# Patient Record
Sex: FEMALE | Race: WHITE
[De-identification: names, ages, dates, MRNs, and addresses within clinical notes are randomized per-mention and may not be internally consistent; named-entity substitution may affect disease eponyms.]

---

## 2020-06-10 ENCOUNTER — HOSPITAL ENCOUNTER (EMERGENCY)
Dept: HOSPITAL 88 - ER | Age: 65
Discharge: HOME | End: 2020-06-10
Payer: SELF-PAY

## 2020-06-10 VITALS — SYSTOLIC BLOOD PRESSURE: 148 MMHG | DIASTOLIC BLOOD PRESSURE: 79 MMHG

## 2020-06-10 VITALS — WEIGHT: 151 LBS | BODY MASS INDEX: 26.75 KG/M2 | HEIGHT: 63 IN

## 2020-06-10 DIAGNOSIS — Z86.73: ICD-10-CM

## 2020-06-10 DIAGNOSIS — S00.83XA: Primary | ICD-10-CM

## 2020-06-10 DIAGNOSIS — F41.9: ICD-10-CM
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
## 2020-06-10 DIAGNOSIS — E78.5: ICD-10-CM

## 2020-06-10 DIAGNOSIS — I10: ICD-10-CM

## 2020-06-10 DIAGNOSIS — Z86.718: ICD-10-CM
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
## 2020-06-10 DIAGNOSIS — F17.210: ICD-10-CM

## 2020-06-10 DIAGNOSIS — M54.2: ICD-10-CM

## 2020-06-10 PROCEDURE — 99283 EMERGENCY DEPT VISIT LOW MDM: CPT

## 2020-06-10 PROCEDURE — 72125 CT NECK SPINE W/O DYE: CPT

## 2020-06-10 PROCEDURE — 70450 CT HEAD/BRAIN W/O DYE: CPT

## 2020-06-10 NOTE — EMERGENCY DEPARTMENT NOTE
History of Present Illnes


History of Present Illness


Chief Complaint:  Head/Face Trauma


History of Present Illness


This is a 64 year old  female presents to the ED for head injury 3 days

prior after tree impacted his head. (-) LOC. Since incident, patient with nausea

and blurriness of vision.  .


Arrival Mode:  Car


Onset (how long ago):  day(s) (3)


Location:  frontal head


Radiation:  Reports non-radiation


Severity:  moderate


Duration (how long):  day(s)


Timing of current episode:  constant


Progression:  worsening


Chronicity:  new


Context:  Reports trauma/injury


Relieving factors:  rest


Exacerbating factors:  movement


Associated symptoms:  Reports headaches, Reports nausea/vomiting


Treatments prior to arrival:  none





Past Medical/Family History


Physician Review


I have reviewed the patient's past medical and family history.  Any updates have

been documented here.





Past Medical History


Recent Fever:  No


Clinical Suspicion of Infectio:  No


New/Unexplained Change in Ment:  No


Past Medical History:  Hypertension, COPD


Other Medical History:  


Depression/anxiety





Cholesterol





Herniated discs





ARF-no dialysis





DVTs





Endometriosis


Other Surgery:  


Right foot sx





R CEA





Attempted L CEA w/out success





Social History


Smoking Cessation:  Current some day smoker


Counseling Performed:  Yes


Alcohol Use:  None


Any Illegal Drug Use:  No





Other


Last Tetanus:  OOD





Review of Systems


Review of Systems


Constitutional:  Reports no symptoms


EENTM:  Reports blurred vision


Cardiovascular:  Reports no symptoms


Respiratory:  Reports no symptoms


Gastrointestinal:  Reports nausea


Genitourinary:  Reports no symptoms


Musculoskeletal:  Reports no symptoms


Integumentary:  Reports no symptoms


Neurological:  Reports headache


Psychological:  Reports no symptoms


Endocrine:  Reports no symptoms


Hematological/Lymphatic:  Reports no symptoms


Review of other systems


All other systems reviewed and negative.





Physical Exam


Related Data


Allergies:  


Coded Allergies:  


     No Known Allergies (Unverified , 17)


Triage Vital Signs





Vital Signs








  Date Time  Temp Pulse Resp B/P (MAP) Pulse Ox O2 Delivery O2 Flow Rate FiO2


 


6/10/20 19:26 99.7 88 20 119/75 98   








Vital signs reviewed:  Yes





Physical Exam


CONSTITUTIONAL





Constitutional:  Reports well-developed, Reports well-nourished


HENT


HENT:  Reports normocephalic, Reports atraumatic, Reports oropharynx 

clear/moist, Reports nose normal


HENT L/R:  Reports left ext ear normal, Reports right ext ear normal


EYES





Eyes:  Reports PERRL, Reports conjunctivae normal


NECK


Neck:  Reports ROM normal


PULMONARY


Pulmonary:  Reports effort normal, Reports breath sounds normal


CARDIOVASCULAR





Cardiovascular:  Reports regular rhythm, Reports heart sounds normal, Reports 

capillary refill normal, Reports normal rate


GASTROINTESTINAL





Abdominal:  Reports soft, Reports nontender, Reports bowel sounds normal


GENITOURINARY





Genitourinary:  Reports exam deferred


SKIN


Skin:  Reports warm, Reports dry


MUSCULOSKELETAL





Musculoskeletal:  Reports ROM normal


NEUROLOGICAL





Neurological:  Reports alert, Reports oriented x 3, Reports no gross motor or 

sensory deficits


PSYCHOLOGICAL


Psychological:  Reports mood/affect normal, Reports judgement normal





Results


Imaging


Imaging results reviewed:  Yes


Impressions


                                        


                        [place] [address]








Patient Name: YOSELIN HSIEH                          MR #: [id number]          


   


: 1955                         Age/Sex: 64/F


Acct #: [id number]                    Req #: [id number]


Adm Physician:                                      


Ordered by: JOSE L ARROYO DO                    Report #: [id number] 


Location: ER                            Room/Bed:           


                                                                                


________________________________________________________________________________


___________________





Procedure: [id number] CT/CT CERVICAL SPINE WO


Exam Date: 06/10/20                            Exam Time: 








                              REPORT STATUS: Signed





Examination: CT CERVICAL SPINE WO CONTRAST





HISTORY:Neck pain and injury after fall.


COMPARISON:None.


TECHNIQUE: Multidetector helical axial images were obtained without contrast


from the foramen magnum to T1.  Coronal and sagittal reformatted images were


done. Bone and soft tissue windows were evaluated. 


Dose modulation, iterative reconstruction, and/or weight based adjustment of


the mA/kV was utilized to reduce the radiation dose to as low as reasonably


achievable. 





FINDINGS:  


Alignment:Normal alignment with straightening of normal lordosis.


Vertebrae:  Normal height and density. No acute fracture, infection or


neoplasm.


Disc space heights: Normal height.


Caliber of spinal canal: Developmentally normal.





Posterior fossa and craniocervical junction: Foramen magnum patent. No Chiari 1


malformation.





Soft tissues: No abnormality.





Degenerative changes:


Bilateral facet arthropathy from C3-C5. 


Grade I anterolisthesis on C4 on C5. 


No disc bulge/ herniation or canal stenosis.





Visualized lung apices:


No abnormalities.





IMPRESSION:


No acute abnormalities.





Bilateral facet arthropathy from C3-C5.


 


Grade I anterolisthesis on C4 on C5. 





Signed by: Dr. Leeanne Blanca M.D. on 6/10/2020 8:51 PM








Dictated By: LEEANNE APARICIO MD


Electronically Signed By: LEEANNE APARICIO MD on 06/10/20 2051


Transcribed By: STEPHANIE on 06/10/20 2051 








COPY TO:   JOSE L ARROYO DO~














                        [place] [address]








Patient Name: YOSELIN HSIEH                          MR #: [id number]          


   


: 1955                         Age/Sex: 64/F


Acct #: [id number]                    Req #: [id number]


Adm Physician:                                      


Ordered by: JOSE L ARROYO DO                    Report #: [id number] 


Location: ER                            Room/Bed:           


                              __________________________________________________


_________________________________________________





Procedure: [id number] CT/CT BRAIN WO


Exam Date: 06/10/20                            Exam Time: 








                              REPORT STATUS: Signed





Examination: CT head without contrast


Clinical Indication: Forehead injury.


Technique: Transaxial noncontrast images from the skull base through the vertex


were obtained. Sagittal and coronal reformatted images were done.


Dose modulation, iterative reconstruction, and/or weight based adjustment of


the mA/kV was utilized to reduce the radiation dose to as low as reasonably


achievable. 


Comparison: None.





Findings:





Scalp: No abnormalities.


Bones: Intact. No fractures.  No blastic or lytic lesions. 





Brain sulci: Appropriate for patient's age.


Ventricles: Normal in size and configuration.   No hydrocephalus.  





Extra-axial space:


No acute abnormalities.


Bifrontal subdural hygromas with maximal thickness of 8mm on each side. 





Parenchyma: 


No abnormal densities.


No masses, hemorrhage, or acute or chronic cortical based vascular insults.





Suprasellar region: No abnormalities.


Craniocervical junction: The foramen magnum is patent.  No Chiari one


malformation.





Impression:





No acute intracranial abnormality.





Signed by: Dr. Leeanne Blanca M.D. on 6/10/2020 8:42 PM








Dictated By: LEEANNE APARICIO MD


Electronically Signed By: LEEANNE APARICIO MD on 06/10/20 2042


Transcribed By: STEPHANIE on 06/10/20 2042 








COPY TO:   JOSE L ARROYO DO~





Assessment & Plan


Medical Decision Making


MDM


64 yof presents with remote h/o of head injury. reports dizziness and nausea 

since incident . CTS ordered and reviewed to r/o brain injury, and skull fx. 

Patient to be discharged to f/u with PCP





Reassessment


Reassessment


Prior to discharge patient requests additional studies to work up her month long

history of LE pain/tingling. Further more had earlier requested to be tested for

antibodies for COVID-19 infection. I explained to the patient that her 

expectations for a full ED evaluation of her chronic issues was not realistic 

and that she would be better suited to f/u with her PCP. Patient refused to 

contact her PCP and patient was instructed that it would be in her best interest

to pursue a relationship with her PCP.





Assessment & Plan


Final Impression:  


(1) Head injury


(2) Anxiety


Last Vital Signs











  Date Time  Temp Pulse Resp B/P (MAP) Pulse Ox O2 Delivery O2 Flow Rate FiO2


 


6/10/20 21:26  84 18  100   


 


6/10/20 21:15 98.5   148/79    








Home Meds


Reported Medications


Aspirin (ASPIRIN) 325 Mg Tablet, 325 MG PO DAILY, TAB


   16


Hydrochlorothiazide (HYDROCHLOROTHIAZIDE) 25 Mg Tablet, 25 MG PO DAILY, #30 TAB


   16


Acetaminophen/Hydrocodone* (NORCO 10MG-325MG*) 1 Ea Tab, 1 TAB PO Q6H PRN for 

PAIN, TAB


   16


Lorazepam (ATIVAN) 2 Mg Tablet, 2 MG PO TID


   16











JOSE L ARROYO DO                Charles 10, 2020 19:26

## 2020-06-10 NOTE — DIAGNOSTIC IMAGING REPORT
Examination: CT head without contrast

Clinical Indication: Forehead injury.

Technique: Transaxial noncontrast images from the skull base through the vertex

were obtained. Sagittal and coronal reformatted images were done.

Dose modulation, iterative reconstruction, and/or weight based adjustment of

the mA/kV was utilized to reduce the radiation dose to as low as reasonably

achievable. 

Comparison: None.



Findings:



Scalp: No abnormalities.

Bones: Intact. No fractures.  No blastic or lytic lesions. 



Brain sulci: Appropriate for patient's age.

Ventricles: Normal in size and configuration.   No hydrocephalus.  



Extra-axial space:

No acute abnormalities.

Bifrontal subdural hygromas with maximal thickness of 8mm on each side. 



Parenchyma: 

No abnormal densities.

No masses, hemorrhage, or acute or chronic cortical based vascular insults.



Suprasellar region: No abnormalities.

Craniocervical junction: The foramen magnum is patent.  No Chiari one

malformation.



Impression:



No acute intracranial abnormality.



Signed by: Dr. Leeanne Blanca M.D. on 6/10/2020 8:42 PM

## 2020-06-10 NOTE — XMS REPORT
Clinical Summary

                             Created on: 06/10/2020



Alycia Zarate

External Reference #: [id number]

: 1955

Sex: Female



Demographics





                          Address                   [address] [place]  [address]

 

                          Home Phone                [phone]

 

                          Preferred Language        English

 

                          Marital Status            

 

                          Catholic Affiliation     [id number]

 

                          Race                      White

 

                          Ethnic Group              Non-





Author





                          Author                    Antwan Quaker

 

                          Organization              [facility]

 

                          Address                   Unknown

 

                          Phone                     Unavailable







Support





                Name            Relationship    Address         Phone

 

                Antonia NAVARRO            Unknown         [phone]







Care Team Providers





                    Care Team Member Name Role                Phone

 

                    Asked, No Pcp       PCP                 Unavailable







Allergies

No Known Allergies



Medications





                          End Date                  Status



              Medication   Sig          Dispensed    Refills      Start  



                                         Date  

 

                                                    Active



                     enalapril (VASOTEC) 20 MG  Take 20 mg by       0   



                           tablet                    mouth daily.     

 

                                                    Active



                     LORAZepam (ATIVAN) 2 MG  Take 2 mg by        0   



                           tablet                    mouth every 8     



                                         (eight) hours     



                                         as needed for     



                                         anxiety.     

 

                                                    Active



                     aspirin 325 MG tablet  Take 325 mg         0   



                                         by mouth     



                                         daily.     

 

                                                    Active



                     PARoxetine (PAXIL) 40 MG  Take 40 mg by       0   



                           tablet                    mouth every     



                                         morning.     







Active Problems





Not on file



Social History





                                        Date



                 Tobacco Use     Types           Packs/Day       Years Used 

 

                                         



                           Current Every Day Smoker   1  

 

    



                                         Smokeless Tobacco: Never   



                                         Used   







                    Drinks/Week         oz/Week             Comments



                                         Alcohol Use   

 

                                                             



                                         No   







  



                     Alcohol Habits      Answer              Date Recorded

 

  



                     How often do you have a drink containing alcohol?  Never   

            2019

 

  



                           How many drinks containing alcohol do you have on  No

t asked 



                                         a typical day when you are drinking?  

 

  



                           How often do you have six or more drinks on one  Not 

asked 



                                         occasion?  







 



                           Sex Assigned at Birth     Date Recorded

 

 



                                         Not on file 







                                        Industry



                           Job Start Date            Occupation 

 

                                        Not on file



                           Not on file               Not on file 







                                        Travel End



                           Travel History            Travel Start 

 





                                         No recent travel history available.







Last Filed Vital Signs

Not on file



Plan of Treatment





   



                 Health Maintenance  Due Date        Last Done       Comments

 

   



                           CERVICAL CANCER SCREENING  1977  

 

   



                           BREAST CANCER SCREENING   2006  

 

   



                           COLONOSCOPY SCREENING     2006  

 

   



                           SHINGLES VACCINES (#1)    2006  

 

   



                           INFLUENZA VACCINE         2020  







Results

Not on fileafter 06/10/2019



Advance Directives





For more information, please contact: [phone]







                          Patient Representative    Explanation



                           Type                      Date Recorded  

 

                                                     



                           Advance Directives,       2019  1:52 PM  



                                         Living Will and   



                                         Medical Power of

## 2020-06-10 NOTE — DIAGNOSTIC IMAGING REPORT
Examination: CT CERVICAL SPINE WO CONTRAST



HISTORY:Neck pain and injury after fall.

COMPARISON:None.

TECHNIQUE: Multidetector helical axial images were obtained without contrast

from the foramen magnum to T1.  Coronal and sagittal reformatted images were

done. Bone and soft tissue windows were evaluated. 

Dose modulation, iterative reconstruction, and/or weight based adjustment of

the mA/kV was utilized to reduce the radiation dose to as low as reasonably

achievable. 



FINDINGS:  

Alignment:Normal alignment with straightening of normal lordosis.

Vertebrae:  Normal height and density. No acute fracture, infection or

neoplasm.

Disc space heights: Normal height.

Caliber of spinal canal: Developmentally normal.



Posterior fossa and craniocervical junction: Foramen magnum patent. No Chiari 1

malformation.



Soft tissues: No abnormality.



Degenerative changes:

Bilateral facet arthropathy from C3-C5. 

Grade I anterolisthesis on C4 on C5. 

No disc bulge/ herniation or canal stenosis.



Visualized lung apices:

No abnormalities.



IMPRESSION:

No acute abnormalities.



Bilateral facet arthropathy from C3-C5.

 

Grade I anterolisthesis on C4 on C5. 



Signed by: Dr. Leeanne Blanca M.D. on 6/10/2020 8:51 PM